# Patient Record
Sex: FEMALE | Race: BLACK OR AFRICAN AMERICAN
[De-identification: names, ages, dates, MRNs, and addresses within clinical notes are randomized per-mention and may not be internally consistent; named-entity substitution may affect disease eponyms.]

---

## 2019-11-02 ENCOUNTER — HOSPITAL ENCOUNTER (EMERGENCY)
Dept: HOSPITAL 74 - JER | Age: 44
Discharge: HOME | End: 2019-11-02
Payer: SELF-PAY

## 2019-11-02 VITALS — DIASTOLIC BLOOD PRESSURE: 85 MMHG | SYSTOLIC BLOOD PRESSURE: 133 MMHG | HEART RATE: 76 BPM

## 2019-11-02 VITALS — TEMPERATURE: 98.5 F

## 2019-11-02 VITALS — BODY MASS INDEX: 31.6 KG/M2
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
## 2019-11-02 DIAGNOSIS — T78.3XXA: Primary | ICD-10-CM

## 2019-11-02 DIAGNOSIS — T78.49XA: ICD-10-CM

## 2019-11-02 DIAGNOSIS — T46.4X5A: ICD-10-CM

## 2019-11-02 DIAGNOSIS — I10: ICD-10-CM

## 2019-11-02 PROCEDURE — 3E0233Z INTRODUCTION OF ANTI-INFLAMMATORY INTO MUSCLE, PERCUTANEOUS APPROACH: ICD-10-PCS

## 2019-11-02 NOTE — PDOC
*Physical Exam





- Vital Signs


 Last Vital Signs











Temp Pulse Resp BP Pulse Ox


 


 98.5 F   88   20   133/93   99 


 


 11/02/19 08:31  11/02/19 08:31  11/02/19 08:31  11/02/19 08:31  11/02/19 08:31














<Alec Walton - Last Filed: 11/02/19 10:07>





- Vital Signs


 Last Vital Signs











Temp Pulse Resp BP Pulse Ox


 


 98.5 F   88   20   133/93   99 


 


 11/02/19 08:31  11/02/19 08:31  11/02/19 08:31  11/02/19 08:31  11/02/19 08:31














<Ashley Schmitt - Last Filed: 11/02/19 10:13>





ED Treatment Course





- Medications


Given in the ED: 


ED Medications














Discontinued Medications














Generic Name Dose Route Start Last Admin





  Trade Name Freq  PRN Reason Stop Dose Admin


 


Methylprednisolone Sodium Succinate  125 mg  11/02/19 05:42  11/02/19 05:46





  Solu-Medrol -  IVPUSH  11/02/19 05:43  125 mg





  ONCE ONE   Administration





     





     





     





     


 


Prednisone  60 mg  11/02/19 09:07  11/02/19 09:25





  Deltasone -  PO  11/02/19 09:08  60 mg





  ONCE ONE   Administration





     





     





     





     














<Alec Walton - Last Filed: 11/02/19 10:07>





- Medications


Given in the ED: 


ED Medications














Discontinued Medications














Generic Name Dose Route Start Last Admin





  Trade Name Freq  PRN Reason Stop Dose Admin


 


Methylprednisolone Sodium Succinate  125 mg  11/02/19 05:42  11/02/19 05:46





  Solu-Medrol -  IVPUSH  11/02/19 05:43  125 mg





  ONCE ONE   Administration





     





     





     





     


 


Prednisone  60 mg  11/02/19 09:07  11/02/19 09:25





  Deltasone -  PO  11/02/19 09:08  60 mg





  ONCE ONE   Administration





     





     





     





     














<Ashley Schmitt - Last Filed: 11/02/19 10:13>





Medical Decision Making





- Medical Decision Making





11/02/19 10:01


Pt observed for airway compromise. No signs/symptoms of impaired respiration.





Pt to be discharged home with instructions to return to E.D immediately if 

develops any difficulties with breathing.  





<Alec Walton - Last Filed: 11/02/19 10:07>





- Medical Decision Making





Patient was observed here in the ED from overnight with presentation of 

angioedema secondary to ACE inhibitor use for the last 2 days for her blood 

pressure control.  She is told to avoid ACE inhibitors in the future as this is 

bradykinin mediated and unlikely to be allergic.  Patient was given steroid 

trial without much effect.  She also took Benadryl prior to presentation at 5 

AM also without effect.  No indication for intubation at this time as patient 

remains well-appearing.  There is no evidence of airway compromise, neuro intact

, breathing comfortably, normal phonation, uvula is midline.  Angioedema is 

localized to the lower face and the upper lip she is breathing comfortably.  

Vital signs are within normal limits, normal sats on room air and comfortable, 

she is comfortable with discharge and told to avoid ACE inhibitors in the 

future.  Amlodipine was sent to her pharmacy as this is what she has taken 

previously for her blood pressure control.  Follow-up with primary care doctor 

in 2 days.  Return precautions for worsening symptoms including respiratory 

distress, airway compromise, breathing difficulties, cyanosis or any other 

concerns related to the angioedema.





11/02/19 10:13








<Ashley Schmitt - Last Filed: 11/02/19 10:13>





Discharge





- Discharge Information


Problems reviewed: Yes





<Alec Walton - Last Filed: 11/02/19 10:07>





<Ashley Schmitt - Last Filed: 11/02/19 10:13>





- Discharge Information


Clinical Impression/Diagnosis: 


Angioedema


Qualifiers:


 Encounter type: initial encounter Qualified Code(s): T78.3XXA - Angioneurotic 

edema, initial encounter





Condition: Stable





- Additional Discharge Information


Prescriptions: 


Amlodipine Besylate 10 mg PO DAILY #7 tablet





- Follow up/Referral





- Patient Discharge Instructions


Patient Printed Discharge Instructions:  DI for Angioedema


Additional Instructions: 


You were seen in the emergency room today for swelling to the lip and face. 

This is likely due to a reaction from the lisinopril. 


Lisinopril belongs to a class of medications called ACE inhibitors. These 

medications can cause swelling in some people. 


You must stop taking lisinopril and tell your doctor the reaction from taking 

this medication. You cannot take any ACE inhibitors in the future.


It can take up to 72 hours for the swelling to go down.





Please see your doctor early next week regarding this ED visit. 


The swelling should decrease on its own. 





Please come back to the emergency room if you have worsening swelling, feel 

short of breath, have difficulty breathing or if any new or concerning symptom 

develops. 





Thank you





- Post Discharge Activity

## 2019-11-02 NOTE — PDOC
History of Present Illness





- General


Stated Complaint: ALLERGIC REACTION


Time Seen by Provider: 11/02/19 05:37


History Source: Patient


Exam Limitations: No Limitations





- History of Present Illness


Initial Comments: 





11/02/19 05:53


44y F with PMH of HTN presenting to ED with complaints of lip and face 

swelling. Patient said she ran out of amlodipine 2d ago and took a friend's 

Lisinopril. She says she noticed a small amount of swelling at 9pm last night. 

She took 50mg Benadryl and went to sleep. She woke up and noticed her face was 

more swollen and came to the ER immediately. Denies swelling, chest pain, sob, 

throat pain, fevers, chills, abdominal pain, n/v/d, throat swelling. 





PMD: Satoo?


PMH: see hpi


Meds: lisinopril


Social: denies


Allergies: ACE-I (angioedema)








Past History





- Past Medical History


Allergies/Adverse Reactions: 


 Allergies











Allergy/AdvReac Type Severity Reaction Status Date / Time


 


ACE Inhibitors AdvReac   Verified 11/02/19 05:47











Home Medications: 


Ambulatory Orders





Amlodipine Besylate 10 mg PO DAILY 11/02/19 


Amlodipine Besylate 10 mg PO DAILY #7 tablet 11/02/19 











**Review of Systems





- Review of Systems


Constitutional: No: Symptoms Reported


HEENTM: Yes: See HPI


Respiratory: No: Symptoms reported


Cardiac (ROS): No: Symptoms Reported


ABD/GI: No: Symptoms Reported


: No: Symptoms Reported


Musculoskeletal: No: Symptoms Reported


Integumentary: No: Symptoms Reported


Neurological: No: Symptoms reported





*Physical Exam





- Physical Exam


General Appearance: Yes: Nourished, Appropriately Dressed.  No: Apparent 

Distress


HEENT: positive: EOMI, SOREN, Pharynx Normal (no uvula, tongue or pharyngeal 

swelling), Other (upper lip swlelling with L cheek swelling. Normal OP)


Neck: positive: Trachea midline, Supple.  negative: Lymphadenopathy (R), 

Lymphadenopathy (L)


Respiratory/Chest: positive: Lungs Clear, Normal Breath Sounds.  negative: 

Crackles, Rales, Rhonchi, Stridor, Wheezing


Cardiovascular: positive: Regular Rhythm, Regular Rate, S1, S2.  negative: Edema

, JVD, Murmur


Gastrointestinal/Abdominal: positive: Normal Bowel Sounds, Soft.  negative: 

Tender


Musculoskeletal: negative: CVA Tenderness


Extremity: positive: Normal Capillary Refill.  negative: Swelling, Calf 

Tenderness, Erythema


Integumentary: positive: Normal Color, Dry, Warm


Neurologic: positive: CNs II-XII NML intact, Fully Oriented, Alert, Normal Mood/

Affect, Normal Response, Motor Strength 5/5





Medical Decision Making





- Medical Decision Making





11/02/19 07:06


44y F presenting with angioedema. 


vitals wnl. 





given solumedrol. 





pt not in active distress. will observe for a few hours. 


signed out to day team. 


likely dc home. 


pt given strict instructions and 7d course of amlodipine because she ran out of 

medications. 


advised to follow up with PMD. 


agrees with plan. 





Discharge





- Discharge Information


Problems reviewed: Yes


Clinical Impression/Diagnosis: 


Angioedema


Qualifiers:


 Encounter type: initial encounter Qualified Code(s): T78.3XXA - Angioneurotic 

edema, initial encounter





Condition: Stable





- Admission


No





- Additional Discharge Information


Prescriptions: 


Amlodipine Besylate 10 mg PO DAILY #7 tablet





- Follow up/Referral





- Patient Discharge Instructions


Patient Printed Discharge Instructions:  DI for Angioedema


Additional Instructions: 


You were seen in the emergency room today for swelling to the lip and face. 

This is likely due to a reaction from the lisinopril. 


Lisinopril belongs to a class of medications called ACE inhibitors. These 

medications can cause swelling in some people. 


You must stop taking lisinopril and tell your doctor the reaction from taking 

this medication. You cannot take any ACE inhibitors in the future.


It can take up to 72 hours for the swelling to go down.





Please see your doctor early next week regarding this ED visit. 


The swelling should decrease on its own. 





Please come back to the emergency room if you have worsening swelling, feel 

short of breath, have difficulty breathing or if any new or concerning symptom 

develops. 





Thank you





- Post Discharge Activity

## 2020-07-22 ENCOUNTER — HOSPITAL ENCOUNTER (EMERGENCY)
Dept: HOSPITAL 74 - JER | Age: 45
Discharge: HOME | End: 2020-07-22
Payer: COMMERCIAL

## 2020-07-22 VITALS — HEART RATE: 98 BPM | TEMPERATURE: 98.6 F | DIASTOLIC BLOOD PRESSURE: 89 MMHG | SYSTOLIC BLOOD PRESSURE: 135 MMHG

## 2020-07-22 VITALS — BODY MASS INDEX: 32.4 KG/M2

## 2020-07-22 DIAGNOSIS — N30.00: Primary | ICD-10-CM

## 2020-07-22 LAB
ALBUMIN SERPL-MCNC: 3.6 G/DL (ref 3.4–5)
ALP SERPL-CCNC: 70 U/L (ref 45–117)
ALT SERPL-CCNC: 20 U/L (ref 13–61)
ANION GAP SERPL CALC-SCNC: 6 MMOL/L (ref 8–16)
ANISOCYTOSIS BLD QL: (no result)
APPEARANCE UR: (no result)
AST SERPL-CCNC: 11 U/L (ref 15–37)
BACTERIA # UR AUTO: 2162 /UL (ref 0–1359)
BASOPHILS # BLD: 0.8 % (ref 0–2)
BILIRUB SERPL-MCNC: 0.1 MG/DL (ref 0.2–1)
BILIRUB UR STRIP.AUTO-MCNC: NEGATIVE MG/DL
BUN SERPL-MCNC: 10.8 MG/DL (ref 7–18)
CALCIUM SERPL-MCNC: 8.9 MG/DL (ref 8.5–10.1)
CASTS URNS QL MICRO: 11 /UL (ref 0–3.1)
CHLORIDE SERPL-SCNC: 104 MMOL/L (ref 98–107)
CO2 SERPL-SCNC: 28 MMOL/L (ref 21–32)
COLOR UR: YELLOW
CREAT SERPL-MCNC: 0.8 MG/DL (ref 0.55–1.3)
DEPRECATED RDW RBC AUTO: 20.2 % (ref 11.6–15.6)
EOSINOPHIL # BLD: 0.5 % (ref 0–4.5)
EPITH CASTS URNS QL MICRO: >36 /UL (ref 0–25.1)
GLUCOSE SERPL-MCNC: 112 MG/DL (ref 74–106)
HCT VFR BLD CALC: 35.2 % (ref 32.4–45.2)
HGB BLD-MCNC: 10.9 GM/DL (ref 10.7–15.3)
KETONES UR QL STRIP: (no result)
LEUKOCYTE ESTERASE UR QL STRIP.AUTO: (no result)
LYMPHOCYTES # BLD: 17.2 % (ref 8–40)
MACROCYTES BLD QL: (no result)
MCH RBC QN AUTO: 21.3 PG (ref 25.7–33.7)
MCHC RBC AUTO-ENTMCNC: 30.9 G/DL (ref 32–36)
MCV RBC: 69 FL (ref 80–96)
MONOCYTES # BLD AUTO: 6.7 % (ref 3.8–10.2)
NEUTROPHILS # BLD: 74.8 % (ref 42.8–82.8)
NITRITE UR QL STRIP: NEGATIVE
OVALOCYTES BLD QL SMEAR: (no result)
PH UR: 8.5 [PH] (ref 5–8)
PLATELET # BLD AUTO: 331 K/MM3 (ref 134–434)
PLATELET BLD QL SMEAR: NORMAL
PMV BLD: 7.9 FL (ref 7.5–11.1)
POTASSIUM SERPLBLD-SCNC: 3.9 MMOL/L (ref 3.5–5.1)
PROT SERPL-MCNC: 7.6 G/DL (ref 6.4–8.2)
PROT UR QL STRIP: (no result)
PROT UR QL STRIP: NEGATIVE
RBC # BLD AUTO: 35 /UL (ref 0–23.9)
RBC # BLD AUTO: 5.1 M/MM3 (ref 3.6–5.2)
SODIUM SERPL-SCNC: 138 MMOL/L (ref 136–145)
SP GR UR: 1.02 (ref 1.01–1.03)
UROBILINOGEN UR STRIP-MCNC: 1 MG/DL (ref 0.2–1)
WBC # BLD AUTO: 8.4 K/MM3 (ref 4–10)
WBC # UR AUTO: 86 /UL (ref 0–25.8)

## 2020-07-22 PROCEDURE — 3E0337Z INTRODUCTION OF ELECTROLYTIC AND WATER BALANCE SUBSTANCE INTO PERIPHERAL VEIN, PERCUTANEOUS APPROACH: ICD-10-PCS | Performed by: NURSE PRACTITIONER

## 2020-07-22 NOTE — PDOC
Rapid Medical Evaluation


Time Seen by Provider: 07/22/20 14:32


Medical Evaluation: 


                                    Allergies











Allergy/AdvReac Type Severity Reaction Status Date / Time


 


ACE Inhibitors AdvReac   Verified 11/02/19 05:47











07/22/20 14:40


I have performed a brief in-person evaluation of this patient.





The patient presents with a chief complaint of:dizziness and nausea while 

cooking at home today. Since improved. No syncope, CP or SOB. H/o HTN





Pertinent physical exam findings:stable and well yayo





I have ordered the following:upreg/ua





The patient will proceed to the ED for further evaluation.








**Discharge Disposition





- Diagnosis


 Dizziness








- Referrals





- Patient Instructions





- Post Discharge Activity

## 2020-07-22 NOTE — PDOC
History of Present Illness





- General


Chief Complaint: Lightheaded


Stated Complaint: NAUSEA,DIZZINESS


Time Seen by Provider: 07/22/20 14:32


History Source: Patient


Exam Limitations: No Limitations





- History of Present Illness


Initial Comments: 





07/22/20 16:51





HISTORY OF PRESENT ILLNESS: 45-year-old woman with past medical history of 

hypertension presents emergency department for evaluation of brief episode of 

lightheadedness followed by one episode of nonbilious nonbloody vomitus.  

Patient reports the vomit was undigested food.  Patient reports she has eaten 

nuts, bananas and had a candy after she vomited and has been free of dizziness 

since then.  Patient reports she feels "slow like I took a large dose of 

Benadryl."  She denies fevers, chills, headaches, blurry vision, chest pain, 

shortness of breath, abdominal pain, constipation, diarrhea, vaginal bleeding, 

vaginal discharge, hematuria or dysuria.





No recent travel or sick contacts. 


PAST MEDICAL HISTORY: Denies past medical history


SURGICAL HISTORY: Denies


ALLERGIES: ACE inhibitors





REVIEW OF SYSTEMS


General/Constitutional: Denies fever or chills. Denies weakness, weight change.


HEENT: Denies change in vision. Denies ear pain or discharge. Denies sore 

throat.


Cardiovascular: Denies chest pain or shortness of breath.


Respiratory: Denies cough, wheezing, or hemoptysis.


Gastrointestinal: See HPI


Genitourinary: Denies dysuria, frequency, or change in urination.


Musculoskeletal: Denies joint or muscle swelling or pain. Denies neck or back 

pain.


Skin and breasts: Denies rash or easy bruising.


Neurologic: Denies headache, vertigo, loss of consciousness, or loss of 

sensation.


Psychiatric: Denies depression or anxiety.


Endocrine: Denies increased thirst. Denies abnormal weight change.


Hematologic/Lymphatic: Denies anemia, easy bleeding, or history of blood clots.


Allergic/Immunologic: Denies hives or skin allergy. Denies latex allergy.











PHYSICAL EXAM


General Appearance: Well-appearing, appropriately dressed.  No apparent 

distress, no intoxication.


HEENT: EOMI, PERRLA, normal ENT inspection, normal voice, TMs normal, pharynx 

normal.  No conjunctival pallor.  No photophobia, scleral icterus.


Neck: Supple.  Trachea midline. No tenderness, rigidity, carotid bruit, stridor,

lymphadenopathy, or thyromegaly. 


Respiratory/Chest: Lungs CTAB.  No shortness of breath, chest tenderness, 

respiratory distress, accessory muscle use. No crackles, rales, rhonchi, 

stridor, wheezing, dullness


Cardiovascular: RRR. S1, S2.  No JVD, murmur, bradycardia, tachycardia.


Vascular Pulses: Dorsalis-Pedis (R): 2+, Dorsalis-Pedis (L): 2+


Gastrointestinal/Abdominal: Normal bowel sounds. Abdomen soft, non-distended.  N

o tenderness or rebound tenderness. No organomegaly, pulsatile mass, guarding, 

hernia, hepatomegaly, splenomegaly.


Lymphatic: No adenopathy, tenderness.


Musculoskeletal/Extremities:  Normal inspection. FROM of all extremities, normal

capillary refill.  Pelvis Stable.  No CVA tenderness. No tenderness to 

extremities, pedal edema, swelling, erythema or deformity.


Integumentary: Appropriate color, dry, warm.  No cyanosis, erythema, jaundice or

rash


Neurologic: CNs II-XII intact. Fully oriented, alert.  Appropriate mood/affect. 

Motor strength 5/5.  No appreciable EOM palsy, facial droop or sensory deficit.


07/22/20 16:55








Past History





- Medical History


Allergies/Adverse Reactions: 


                                    Allergies











Allergy/AdvReac Type Severity Reaction Status Date / Time


 


lisinopril Allergy   Verified 07/22/20 14:44


 


ACE Inhibitors AdvReac   Verified 07/22/20 14:44











Home Medications: 


Ambulatory Orders





Amlodipine Besylate 10 mg PO DAILY 11/02/19 


Amlodipine Besylate 10 mg PO DAILY #7 tablet 11/02/19 


Cephalexin Monohydrate [Keflex -] 500 mg PO BID #20 capsule 07/22/20 








COPD: No


HTN: Yes





- Immunization History


Td Vaccination: Yes


TDAP Vaccination: Yes


Immunization Up to Date: Yes





- Psycho-Social/Smoking History


Smoking History: Never smoked


Have you smoked in the past 12 months: No





*Physical Exam





- Vital Signs


                                Last Vital Signs











Temp Pulse Resp BP Pulse Ox


 


 98.6 F   98 H  18   135/89   99 


 


 07/22/20 14:38  07/22/20 14:38  07/22/20 14:38  07/22/20 14:38  07/22/20 14:38














ED Treatment Course





- LABORATORY


CBC & Chemistry Diagram: 


                                 07/22/20 15:00





                                 07/22/20 15:00





- ADDITIONAL ORDERS


Additional order review: 


                               Laboratory  Results











  07/22/20 07/22/20





  15:00 15:00


 


Sodium  138 


 


Potassium  3.9 


 


Chloride  104 


 


Carbon Dioxide  28 


 


Anion Gap  6 L 


 


BUN  10.8 


 


Creatinine  0.8 


 


Est GFR (CKD-EPI)AfAm  103.92 


 


Est GFR (CKD-EPI)NonAf  89.66 


 


Random Glucose  112 H 


 


Calcium  8.9 


 


Total Bilirubin  0.1 L 


 


AST  11 L 


 


ALT  20 


 


Alkaline Phosphatase  70 


 


Total Protein  7.6 


 


Albumin  3.6 


 


Urine Color   Yellow


 


Urine Appearance   Cloudy


 


Urine pH   8.5 H


 


Ur Specific Gravity   1.023


 


Urine Protein   1+ H


 


Urine Glucose (UA)   Negative


 


Urine Ketones   Trace H


 


Urine Blood   Trace


 


Urine Nitrite   Negative


 


Urine Bilirubin   Negative


 


Urine Urobilinogen   1.0


 


Ur Leukocyte Esterase   1+ H


 


Urine WBC (Auto)   86


 


Urine RBC (Auto)   35


 


Urine Casts (Auto)   11


 


U Pathogenic Cast Auto   Non seen


 


U Epithel Cells (Auto)   >36


 


Urine Bacteria (Auto)   2162


 


Urine HCG, Qual   Negative








                                        











  07/22/20





  15:00


 


RBC  5.10


 


MCV  69.0 L


 


MCHC  30.9 L


 


RDW  20.2 H


 


MPV  7.9


 


Neutrophils %  74.8


 


Lymphocytes %  17.2


 


Monocytes %  6.7


 


Eosinophils %  0.5


 


Basophils %  0.8














Medical Decision Making





- Medical Decision Making





07/22/20 16:53


A/P:


45-year-old woman with lightheadedness and vomiting is currently resolved





Physical exam is unremarkable.





Laboratory testing is suggestive of urinary tract infection.


Normal saline 1 L IV bolus


Discharge home with prescription for Keflex.





I discussed the physical exam findings, ancillary test results and final 

diagnoses with the patient. I answered all of the patient's questions. The 

patient was satisfied with the care received and felt comfortable with the 

discharge plan and treatment plan. The patient will call their primary care ph

ysician within 24 hours to arrange follow-up and will return to the Emergency 

Department with any new, persistent or worsening symptoms.





Portions of this note have been documented using voice recognition software. As 

a result, errors may occur in the transcription process. Effort has been made to

correct all grammatical and transcription error, but some may have been missed 

which may produce sporadic inaccurate transcription or nonsensical phrases.





Discharge





- Discharge Information


Problems reviewed: Yes


Clinical Impression/Diagnosis: 


UTI (urinary tract infection)


Qualifiers:


 Urinary tract infection type: acute cystitis Hematuria presence: without 

hematuria Qualified Code(s): N30.00 - Acute cystitis without hematuria





Condition: Stable


Disposition: HOME





- Admission


No





- Additional Discharge Information


Prescriptions: 


Cephalexin Monohydrate [Keflex -] 500 mg PO BID #20 capsule





- Follow up/Referral





- Patient Discharge Instructions


Additional Instructions: 


Rest, drink lots of fluids: Teas, water, soups





Avoid contact with others until fevers and symptoms resolved


Lots of handwashing and good hygiene





Continue over-the-counter medications for symptomatic relief


Tylenol or Motrin for fever and pain


Continue all of antibiotics until completed


Followup with private physician in one week for repeat urinalysis/reevaluation





Return to emergency department for worsened symptoms, fevers, dehydration





- Post Discharge Activity

## 2021-10-17 ENCOUNTER — HOSPITAL ENCOUNTER (EMERGENCY)
Dept: HOSPITAL 74 - JER | Age: 46
Discharge: HOME | End: 2021-10-17
Payer: COMMERCIAL

## 2021-10-17 VITALS — TEMPERATURE: 97.5 F

## 2021-10-17 VITALS — DIASTOLIC BLOOD PRESSURE: 91 MMHG | SYSTOLIC BLOOD PRESSURE: 142 MMHG | HEART RATE: 74 BPM

## 2021-10-17 VITALS — BODY MASS INDEX: 29.9 KG/M2

## 2021-10-17 DIAGNOSIS — O03.9: ICD-10-CM

## 2021-10-17 DIAGNOSIS — N93.9: Primary | ICD-10-CM

## 2021-10-17 LAB
ALBUMIN SERPL-MCNC: 3.7 G/DL (ref 3.4–5)
ALP SERPL-CCNC: 86 U/L (ref 45–117)
ALT SERPL-CCNC: 21 U/L (ref 13–61)
ANION GAP SERPL CALC-SCNC: 7 MMOL/L (ref 8–16)
ANISOCYTOSIS BLD QL: (no result)
APPEARANCE UR: CLEAR
APTT BLD: 31.3 SECONDS (ref 25.2–36.5)
AST SERPL-CCNC: 15 U/L (ref 15–37)
BACTERIA # UR AUTO: 5 /UL (ref 0–1359)
BASOPHILS # BLD: 0.5 % (ref 0–2)
BASOPHILS # BLD: 0.7 % (ref 0–2)
BILIRUB SERPL-MCNC: 0.2 MG/DL (ref 0.2–1)
BILIRUB UR STRIP.AUTO-MCNC: NEGATIVE MG/DL
BUN SERPL-MCNC: 8.6 MG/DL (ref 7–18)
CALCIUM SERPL-MCNC: 8.4 MG/DL (ref 8.5–10.1)
CASTS URNS QL MICRO: 0 /UL (ref 0–3.1)
CHLORIDE SERPL-SCNC: 104 MMOL/L (ref 98–107)
CO2 SERPL-SCNC: 29 MMOL/L (ref 21–32)
COLOR UR: YELLOW
CREAT SERPL-MCNC: 0.7 MG/DL (ref 0.55–1.3)
DEPRECATED RDW RBC AUTO: 18.9 % (ref 11.6–15.6)
DEPRECATED RDW RBC AUTO: 19.1 % (ref 11.6–15.6)
EOSINOPHIL # BLD: 1.6 % (ref 0–4.5)
EOSINOPHIL # BLD: 1.8 % (ref 0–4.5)
EPITH CASTS URNS QL MICRO: 2 /UL (ref 0–25.1)
GLUCOSE SERPL-MCNC: 75 MG/DL (ref 74–106)
HCT VFR BLD CALC: 33.8 % (ref 32.4–45.2)
HCT VFR BLD CALC: 34.6 % (ref 32.4–45.2)
HGB BLD-MCNC: 10.9 GM/DL (ref 10.7–15.3)
HGB BLD-MCNC: 11.1 GM/DL (ref 10.7–15.3)
INR BLD: 1.12 (ref 0.83–1.09)
KETONES UR QL STRIP: NEGATIVE
LEUKOCYTE ESTERASE UR QL STRIP.AUTO: NEGATIVE
LYMPHOCYTES # BLD: 23.9 % (ref 8–40)
LYMPHOCYTES # BLD: 24.5 % (ref 8–40)
MACROCYTES BLD QL: (no result)
MCH RBC QN AUTO: 22 PG (ref 25.7–33.7)
MCH RBC QN AUTO: 22.4 PG (ref 25.7–33.7)
MCHC RBC AUTO-ENTMCNC: 32 G/DL (ref 32–36)
MCHC RBC AUTO-ENTMCNC: 32.1 G/DL (ref 32–36)
MCV RBC: 68.4 FL (ref 80–96)
MCV RBC: 70 FL (ref 80–96)
MONOCYTES # BLD AUTO: 6.4 % (ref 3.8–10.2)
MONOCYTES # BLD AUTO: 8.3 % (ref 3.8–10.2)
NEUTROPHILS # BLD: 65.7 % (ref 42.8–82.8)
NEUTROPHILS # BLD: 66.6 % (ref 42.8–82.8)
NITRITE UR QL STRIP: NEGATIVE
PH UR: 8 [PH] (ref 5–8)
PLATELET # BLD AUTO: 329 10^3/UL (ref 134–434)
PLATELET # BLD AUTO: 368 10^3/UL (ref 134–434)
PLATELET BLD QL SMEAR: NORMAL
PMV BLD: 7.4 FL (ref 7.5–11.1)
PMV BLD: 8.4 FL (ref 7.5–11.1)
PROT SERPL-MCNC: 8.3 G/DL (ref 6.4–8.2)
PROT UR QL STRIP: NEGATIVE
PROT UR QL STRIP: NEGATIVE
PT PNL PPP: 12.6 SEC (ref 9.7–13)
RBC # BLD AUTO: 4.94 M/MM3 (ref 3.6–5.2)
RBC # BLD AUTO: 4.94 M/MM3 (ref 3.6–5.2)
RBC # BLD AUTO: 618 /UL (ref 0–23.9)
SODIUM SERPL-SCNC: 140 MMOL/L (ref 136–145)
SP GR UR: 1 (ref 1.01–1.03)
UROBILINOGEN UR STRIP-MCNC: 0.2 MG/DL (ref 0.2–1)
WBC # BLD AUTO: 5.5 K/MM3 (ref 4–10)
WBC # BLD AUTO: 5.9 K/MM3 (ref 4–10)
WBC # UR AUTO: 1 /UL (ref 0–25.8)

## 2023-07-24 ENCOUNTER — HOSPITAL ENCOUNTER (EMERGENCY)
Dept: HOSPITAL 74 - JER | Age: 48
Discharge: HOME | End: 2023-07-24
Payer: COMMERCIAL

## 2023-07-24 VITALS
HEART RATE: 72 BPM | SYSTOLIC BLOOD PRESSURE: 140 MMHG | RESPIRATION RATE: 13 BRPM | TEMPERATURE: 97.8 F | DIASTOLIC BLOOD PRESSURE: 85 MMHG

## 2023-07-24 VITALS — BODY MASS INDEX: 30.7 KG/M2

## 2023-07-24 DIAGNOSIS — M25.512: Primary | ICD-10-CM

## 2023-07-24 PROCEDURE — 3E0233Z INTRODUCTION OF ANTI-INFLAMMATORY INTO MUSCLE, PERCUTANEOUS APPROACH: ICD-10-PCS
